# Patient Record
Sex: FEMALE
[De-identification: names, ages, dates, MRNs, and addresses within clinical notes are randomized per-mention and may not be internally consistent; named-entity substitution may affect disease eponyms.]

---

## 2017-03-22 ENCOUNTER — HOSPITAL ENCOUNTER (EMERGENCY)
Dept: HOSPITAL 5 - ED | Age: 37
Discharge: HOME | End: 2017-03-22
Payer: COMMERCIAL

## 2017-03-22 VITALS — DIASTOLIC BLOOD PRESSURE: 76 MMHG | SYSTOLIC BLOOD PRESSURE: 110 MMHG

## 2017-03-22 DIAGNOSIS — G43.909: Primary | ICD-10-CM

## 2017-03-22 PROCEDURE — 96375 TX/PRO/DX INJ NEW DRUG ADDON: CPT

## 2017-03-22 PROCEDURE — 96361 HYDRATE IV INFUSION ADD-ON: CPT

## 2017-03-22 PROCEDURE — 99284 EMERGENCY DEPT VISIT MOD MDM: CPT

## 2017-03-22 PROCEDURE — 96374 THER/PROPH/DIAG INJ IV PUSH: CPT

## 2017-03-22 PROCEDURE — 70450 CT HEAD/BRAIN W/O DYE: CPT

## 2017-03-22 NOTE — EMERGENCY DEPARTMENT REPORT
ED Headache HPI





- General


Chief Complaint: Headache


Stated Complaint: MIGRAINE


Time Seen by Provider: 17 12:18


Source: patient


Exam Limitations: no limitations





- History of Present Illness


Timing/Duration: 1 week, episodic, waxing and waning


Quality: moderate


Head Injury Location: frontal, temporal


Recent Head Trauma: no recent headache/trauma, frequent headaches, chronic 

headaches


Modifying Factors: improves with: exposure to light, movement


Associated Symptoms: denies: confusion, fatigue, facial pain, fever/chills, 

flushing, loss of consciousness, nausea/vomiting, nasal congestion, nasal 

drainage, numbness in legs/feet, rash, seizures, sinus infection, stiff neck, 

vision changes


Allergies/Adverse Reactions: 


Allergies





No Known Allergies Allergy (Unverified 17 11:53)


 








Home Medications: 


Ambulatory Orders





Diclofenac Sodium 75 mg PO BID #20 tablet. 17 


HYDROcodone/APAP 5-325 [Barton 5/325] 1 each PO Q6HR PRN #15 tablet 17 


Promethazine [Phenergan TAB] 25 mg PO Q8HR PRN #15 tab 17 











ED Review of Systems


ROS: 


Stated complaint: MIGRAINE


Other details as noted in HPI





Comment: All other systems reviewed and negative





ED Past Medical Hx





- Past Medical History


Previous Medical History?: Yes


Hx Headaches / Migraines: Yes





- Surgical History


Past Surgical History?: Yes


Additional Surgical History:  x 3





- Social History


Smoking Status: Never Smoker


Substance Use Type: Prescribed





- Medications


Home Medications: 


 Home Medications











 Medication  Instructions  Recorded  Confirmed  Last Taken  Type


 


Diclofenac Sodium 75 mg PO BID #20 tablet. 17  Unknown Rx


 


HYDROcodone/APAP 5-325 [Barton 1 each PO Q6HR PRN #15 tablet 17  Unknown Rx





5/325]     


 


Promethazine [Phenergan TAB] 25 mg PO Q8HR PRN #15 tab 17  Unknown Rx














ED Physical Exam





- General


Limitations: No Limitations


General appearance: alert, in no apparent distress





- Head


Head exam: Present: atraumatic, normocephalic





- Eye


Eye exam: Present: normal appearance, PERRL, EOMI


Pupils: Present: normal accommodation





- ENT


ENT exam: Present: mucous membranes moist





- Neck


Neck exam: Present: normal inspection





- Respiratory


Respiratory exam: Present: normal lung sounds bilaterally.  Absent: respiratory 

distress





- Cardiovascular


Cardiovascular Exam: Present: regular rate, normal rhythm.  Absent: systolic 

murmur, diastolic murmur, rubs, gallop





- GI/Abdominal


GI/Abdominal exam: Present: soft, normal bowel sounds





- Extremities Exam


Extremities exam: Present: normal inspection





- Back Exam


Back exam: Present: normal inspection





- Neurological Exam


Neurological exam: Present: alert, oriented X3, CN II-XII intact, normal gait, 

reflexes normal





- Psychiatric


Psychiatric exam: Present: normal affect, normal mood





- Skin


Skin exam: Present: warm, dry, intact, normal color.  Absent: rash





ED Course


 Vital Signs











  17





  11:53 13:00 13:03


 


Temperature 97.6 F  


 


Pulse Rate 100 H  


 


Respiratory 21 18 18





Rate   


 


Blood Pressure 163/107  


 


Blood Pressure   





[Right]   


 


O2 Sat by Pulse 100 100 





Oximetry   














  17





  15:23


 


Temperature 98.3 F


 


Pulse Rate 83


 


Respiratory 18





Rate 


 


Blood Pressure 


 


Blood Pressure 110/76





[Right] 


 


O2 Sat by Pulse 97





Oximetry 











Critical care attestation.: 


If time is entered above; I have spent that time in minutes in the direct care 

of this critically ill patient, excluding procedure time.








ED Disposition


Disposition: DISCHARGED TO HOME OR SELFCARE


Is pt being admited?: No


Does the pt Need Aspirin: No


Condition: Good


Instructions:  Migraine Headache (ED)


Prescriptions: 


Diclofenac Sodium 75 mg PO BID #20 tablet.


HYDROcodone/APAP 5-325 [Barton 5/325] 1 each PO Q6HR PRN #15 tablet


 PRN Reason: Pain


Promethazine [Phenergan TAB] 25 mg PO Q8HR PRN #15 tab


 PRN Reason: Nausea


Referrals: 


PRIMARY CARE,MD [Primary Care Provider] - 3-5 Days


Time of Disposition: 15:05

## 2017-03-22 NOTE — CAT SCAN REPORT
CRANIAL CT SCAN:

Serial contiguous axial images were obtained through the cranium.

Intravenous contrast material was not administered.  The ventricles are 

normal in size and appearance.  There is no mass effect or midline 

shift.  No areas of abnormally increased or decreased attenuation are 

seen.  No mass lesion is seen.



The mastoid air cells and visualized portions of the sinuses are

normal.



IMPRESSION:

Cranial CT scan within normal limits.

## 2018-09-12 ENCOUNTER — HOSPITAL ENCOUNTER (EMERGENCY)
Dept: HOSPITAL 5 - ED | Age: 38
LOS: 1 days | Discharge: HOME | End: 2018-09-13
Payer: SELF-PAY

## 2018-09-12 VITALS — DIASTOLIC BLOOD PRESSURE: 61 MMHG | SYSTOLIC BLOOD PRESSURE: 134 MMHG

## 2018-09-12 DIAGNOSIS — Z98.51: ICD-10-CM

## 2018-09-12 DIAGNOSIS — M79.1: ICD-10-CM

## 2018-09-12 DIAGNOSIS — R50.9: ICD-10-CM

## 2018-09-12 DIAGNOSIS — G43.909: ICD-10-CM

## 2018-09-12 DIAGNOSIS — N30.00: Primary | ICD-10-CM

## 2018-09-12 PROCEDURE — 99283 EMERGENCY DEPT VISIT LOW MDM: CPT

## 2018-09-12 PROCEDURE — 81001 URINALYSIS AUTO W/SCOPE: CPT

## 2018-09-13 LAB
BACTERIA #/AREA URNS HPF: (no result) /HPF
BILIRUB UR QL STRIP: (no result)
BLOOD UR QL VISUAL: (no result)
MUCOUS THREADS #/AREA URNS HPF: (no result) /HPF
PH UR STRIP: 6 [PH] (ref 5–7)
RBC #/AREA URNS HPF: 9 /HPF (ref 0–6)
UROBILINOGEN UR-MCNC: 2 MG/DL (ref ?–2)
WBC #/AREA URNS HPF: > 182 /HPF (ref 0–6)

## 2018-09-13 NOTE — EMERGENCY DEPARTMENT REPORT
ED Female  HPI





- General


Chief complaint: Fever


Stated complaint: FEVER,BODY ACHES


Time Seen by Provider: 18 00:15


Source: patient


Mode of arrival: Ambulatory


Limitations: No Limitations





- History of Present Illness


Initial comments: 





38-year-old occasional female presents to the emergency room for several chills 

sweats body aches and dysuria.  Patient reports taking over-the-counter Tylenol 

she admits to nausea and vomited times once.  Admits to burning with urination.

  Has no known drug allergies currently takes no medications on a daily basis 

and this has a history of tubal ligation and migraines.


MD Complaint: dysuria


-: days(s) (2)


Location: suprapubic


Radiation: non-radiating


Severity scale (0 -10): 10


Quality: burning


Consistency: constant


Improves with: none


Worsens with: urination


Are you Pregnant Now?: No


Last Menstrual Period: 17


EDC: 17


Associated Symptoms: nausea/vomiting, fever/chills, dysuria





- Related Data


 Previous Rx's











 Medication  Instructions  Recorded  Last Taken  Type


 


Diclofenac Sodium 75 mg PO BID #20 tablet. 17 Unknown Rx


 


HYDROcodone/APAP 5-325 [[place] 1 each PO Q6HR PRN #15 tablet 17 Unknown Rx





5/325]    


 


Promethazine [Phenergan TAB] 25 mg PO Q8HR PRN #15 tab 17 Unknown Rx


 


Nitrofurantoin Monohyd/M-Cryst 100 mg PO BID #20 capsule 18 Unknown Rx





[Macrobid 100 mg Capsule]    


 


Phenazopyridine [Pyridium] 100 mg PO TID #6 tab 18 Unknown Rx











 Allergies











Allergy/AdvReac Type Severity Reaction Status Date / Time


 


No Known Allergies Allergy   Unverified 17 11:53














ED Review of Systems


ROS: 


Stated complaint: FEVER,BODY ACHES


Other details as noted in HPI





Comment: All other systems reviewed and negative


Constitutional: chills


Gastrointestinal: nausea, vomiting (times one)


Genitourinary: urgency, dysuria, frequency


Musculoskeletal: back pain





ED Past Medical Hx





- Past Medical History


Hx Headaches / Migraines: Yes





- Surgical History


Additional Surgical History:  x 3, tubiligation, ectopic pregnancy





- Social History


Smoking Status: Never Smoker


Substance Use Type: None





- Medications


Home Medications: 


 Home Medications











 Medication  Instructions  Recorded  Confirmed  Last Taken  Type


 


Diclofenac Sodium 75 mg PO BID #20 tablet. 17  Unknown Rx


 


HYDROcodone/APAP 5-325 [[place] 1 each PO Q6HR PRN #15 tablet 17  Unknown Rx





5/325]     


 


Promethazine [Phenergan TAB] 25 mg PO Q8HR PRN #15 tab 17  Unknown Rx


 


Nitrofurantoin Monohyd/M-Cryst 100 mg PO BID #20 capsule 18  Unknown Rx





[Macrobid 100 mg Capsule]     


 


Phenazopyridine [Pyridium] 100 mg PO TID #6 tab 18  Unknown Rx














ED Physical Exam





- General


Limitations: No Limitations


General appearance: alert, in no apparent distress





- Head


Head exam: Present: atraumatic, normocephalic





- Eye


Eye exam: Present: EOMI





- Respiratory


Respiratory exam: Present: normal lung sounds bilaterally.  Absent: respiratory 

distress





- Cardiovascular


Cardiovascular Exam: Present: regular rate, normal rhythm.  Absent: systolic 

murmur, diastolic murmur, rubs, gallop





- GI/Abdominal


GI/Abdominal exam: Present: soft, tenderness (suprapubic )





- Extremities Exam


Extremities exam: Present: full ROM





- Neurological Exam


Neurological exam: Present: alert, oriented X3





- Psychiatric


Psychiatric exam: Present: normal affect, normal mood





- Skin


Skin exam: Present: warm, dry, intact, normal color.  Absent: rash





ED Course





 Vital Signs











  18





  19:20


 


Temperature 99.8 F H


 


Pulse Rate 107 H


 


Respiratory 14





Rate 


 


Blood Pressure 134/61


 


O2 Sat by Pulse 97





Oximetry 











Critical care attestation.: 


If time is entered above; I have spent that time in minutes in the direct care 

of this critically ill patient, excluding procedure time.








ED Disposition


Clinical Impression: 


UTI (urinary tract infection)


Qualifiers:


 Urinary tract infection type: acute cystitis Hematuria presence: without 

hematuria Qualified Code(s): N30.00 - Acute cystitis without hematuria





Disposition:  TO HOME OR SELFCARE


Is pt being admited?: No


Does the pt Need Aspirin: No


Condition: Stable


Instructions:  Urinary Tract Infection in Women (ED)


Additional Instructions: 


Please complete antibiotics as prescribed.  You can take Pyridium for urethral 

spasms.  Please increase her water intake by 2 L.  Follow up with her primary 

care provider if symptoms persist or gets worse.


Prescriptions: 


Nitrofurantoin Monohyd/M-Cryst [Macrobid 100 mg Capsule] 100 mg PO BID #20 

capsule


Phenazopyridine [Pyridium] 100 mg PO TID #6 tab


Referrals: 


PRIMARY CARE,MD [Primary Care Provider] - 3-5 Days


Forms:  Work/School Release Form(ED), Accompanied Note